# Patient Record
Sex: MALE | Race: BLACK OR AFRICAN AMERICAN | Employment: OTHER | URBAN - METROPOLITAN AREA
[De-identification: names, ages, dates, MRNs, and addresses within clinical notes are randomized per-mention and may not be internally consistent; named-entity substitution may affect disease eponyms.]

---

## 2022-09-16 DIAGNOSIS — Z12.11 SCREENING FOR COLON CANCER: Primary | ICD-10-CM

## 2023-02-17 VITALS — BODY MASS INDEX: 29.59 KG/M2 | HEIGHT: 75 IN | WEIGHT: 238 LBS

## 2023-02-17 DIAGNOSIS — Z12.11 SCREENING FOR COLON CANCER: ICD-10-CM

## 2023-02-17 DIAGNOSIS — Z86.010 PERSONAL HISTORY OF COLONIC POLYPS: Primary | ICD-10-CM

## 2023-02-17 RX ORDER — AMLODIPINE BESYLATE 5 MG/1
5 TABLET ORAL EVERY MORNING
COMMUNITY
Start: 2022-11-23

## 2023-02-17 RX ORDER — BRIMONIDINE TARTRATE 2 MG/ML
1 SOLUTION/ DROPS OPHTHALMIC 3 TIMES DAILY
COMMUNITY
Start: 2022-10-12

## 2023-02-17 RX ORDER — MULTIVIT WITH MINERALS/HERBS
1 TABLET ORAL DAILY
COMMUNITY

## 2023-02-17 RX ORDER — IRBESARTAN 300 MG/1
300 TABLET ORAL
COMMUNITY
Start: 2022-11-21

## 2023-02-17 RX ORDER — HYDROCHLOROTHIAZIDE 25 MG/1
25 TABLET ORAL
COMMUNITY
Start: 2022-11-23

## 2023-02-17 RX ORDER — LATANOPROST 50 UG/ML
1 SOLUTION/ DROPS OPHTHALMIC NIGHTLY
COMMUNITY
Start: 2023-02-02

## 2023-02-17 RX ORDER — B-COMPLEX WITH VITAMIN C
1 TABLET ORAL DAILY
COMMUNITY

## 2023-02-17 NOTE — TELEPHONE ENCOUNTER
----- Message from Jaydon Zuniga MA sent at 2/16/2023  3:44 PM CST -----  Contact: self    ----- Message -----  From: Lorelei Zayas RN  Sent: 2/13/2023  10:20 AM CST  To: [facility] Procedure Scheduling      ----- Message -----  From: Reema Beltran  Sent: 2/13/2023   9:33 AM CST  To: [facility] Staff    Requesting a call back regarding scheduling a colonoscopy. Please call back at [phone]

## 2023-02-17 NOTE — TELEPHONE ENCOUNTER
Returned call to pt and got him scheduled and chart updated. Pt voiced no hx of cpap, heart pr walking problem. Pt voiced understood information will be emailed and coupon link.

## 2023-02-20 RX ORDER — SOD SULF/POT CHLORIDE/MAG SULF 1.479 G
12 TABLET ORAL DAILY
Qty: 24 TABLET | Refills: 0 | Status: SHIPPED | OUTPATIENT
Start: 2023-02-20

## 2023-02-21 NOTE — TELEPHONE ENCOUNTER
[facility]  [address] [place] [address]  Phone: [phone]  Fax: [phone]    History & Physical         Provider: Dr. Raudel Ivory    Patient Name: Thierno POTTS (age):1950  72 y.o.           Gender: male   Phone: [phone]     Referring Physician: Mika Osborne     Vital Signs:   Height - 6'3  Weight - 238 lb   BMI -  29.75    Plan: Colonoscopy @ [facility]     Encounter Diagnoses   Name Primary?    Personal history of colonic polyps Yes    Screening for colon cancer            History:      Past Medical History:   Diagnosis Date    BMI 29.0-29.9,adult     Essential (primary) hypertension     Unspecified glaucoma       Past Surgical History:   Procedure Laterality Date    COLONOSCOPY  2019    polyps    EYE SURGERY Bilateral     HIP REPLACEMENT ARTHROPLASTY Bilateral     PROSTATECTOMY        Medication List with Changes/Refills   New Medications    SOD SULF-POT CHLORIDE-MAG SULF (SUTAB) 1.479-0.188- 0.225 GRAM TABLET    Take 12 tablets by mouth once daily. Take according to package instructions with indicated amount of water. No breakfast day before test. May substitute with Suprep, Clenpiq, Plenvu, Moviprep or GoLytely based on Rx plan and patient preference.   Current Medications    AMLODIPINE (NORVASC) 5 MG TABLET    Take 5 mg by mouth every morning.    B COMPLEX VITAMINS TABLET    Take 1 tablet by mouth once daily.    B-COMPLEX WITH VITAMIN C (Z-BEC OR EQUIV) TABLET    Take 1 tablet by mouth once daily.    BRIMONIDINE 0.2% (ALPHAGAN) 0.2 % DROP    Place 1 drop into both eyes 3 (three) times daily.    HYDROCHLOROTHIAZIDE (HYDRODIURIL) 25 MG TABLET    Take 25 mg by mouth.    IRBESARTAN (AVAPRO) 300 MG TABLET    Take 300 mg by mouth.    LATANOPROST 0.005 % OPHTHALMIC SOLUTION    Place 1 drop into both eyes every evening.      Review of patient's allergies indicates:  No Known  Allergies   History reviewed. No pertinent family history.         Physical Examination:     General Appearance:___________________________  HEENT: _____________________________________  Abdomen:____________________________________  Heart:________________________________________  Lungs:_______________________________________  Extremities:___________________________________  Skin:_________________________________________  Endocrine:____________________________________  Genitourinary:_________________________________  Neurological:__________________________________      Patient has been evaluated immediately prior to sedation and is medically cleared for endoscopy with IVCS as an ASA class: ______      Physician Signature: _________________________       Date: ________  Time: ________

## 2023-03-28 ENCOUNTER — OUTSIDE PLACE OF SERVICE (OUTPATIENT)
Dept: GASTROENTEROLOGY | Facility: CLINIC | Age: 73
End: 2023-03-28
Payer: MEDICARE

## 2023-03-28 LAB — CRC RECOMMENDATION EXT: NORMAL

## 2023-03-28 PROCEDURE — 45385 PR COLONOSCOPY,REMV LESN,SNARE: ICD-10-PCS | Mod: PT,,, | Performed by: INTERNAL MEDICINE

## 2023-03-28 PROCEDURE — 45385 COLONOSCOPY W/LESION REMOVAL: CPT | Mod: PT,,, | Performed by: INTERNAL MEDICINE

## 2023-04-19 ENCOUNTER — DOCUMENTATION ONLY (OUTPATIENT)
Dept: GASTROENTEROLOGY | Facility: CLINIC | Age: 73
End: 2023-04-19
Payer: MEDICARE

## 2023-04-25 ENCOUNTER — TELEPHONE (OUTPATIENT)
Dept: GASTROENTEROLOGY | Facility: CLINIC | Age: 73
End: 2023-04-25
Payer: MEDICARE

## 2023-04-25 NOTE — TELEPHONE ENCOUNTER
Called patient and informed of colonoscopy results, verbalized understanding. Denies questions or concerns at this time.